# Patient Record
(demographics unavailable — no encounter records)

---

## 2025-06-25 NOTE — HISTORY OF PRESENT ILLNESS
[FreeTextEntry1] :  POSTPARTUM 6 WEEKS   27yo, , s/p NVD of baby boy Lexi, 7lbs 10oz, Apgar 8, 9. Fisrt degree perineal laceration repaired. EBL/   Breastfeeding well   Bleeding stopped at 5+wks   Laceration healed   Eating and drinking well   Sleeping when she can   Baby blues: denies   Support: good support from partner   Contraception: considering IUD, no contraindications   PE:  Breasts soft, NT, no masses, nipples intact, lactating  Abdomen soft, NT. Incision  Ext neg  Pelvic:  BUS neg  Vulva no lesions  Vagina well-healed  Cx LCP, NT  Uterus firm, NT  Adnexa NT, no masses  Tone: mildly lax  A: Normal 6 week PP exam, doing well, lactating   P: Self-care and baby care discussed Breastfeeding reviewed Diet and vitamins reviewed Cleared for exercise and sex Contraception options discussed RTO for annual/prn

## 2025-07-22 NOTE — PROCEDURE
[IUD Placement] : intrauterine device (IUD) placement [Time out performed] : Pre-procedure time out performed.  Patient's name, date of birth and procedure confirmed. [Consent Obtained] : Consent obtained [Prevention of Pregnancy] : prevention of pregnancy [Risks] : risks [Benefits] : benefits [Alternatives] : alternatives [Patient] : patient [Infection] : infection [Bleeding] : bleeding [Pain] : pain [Expulsion] : expulsion [Failure] : failure [Uterine Perforation] : uterine perforation [Neg Pregnancy Test] : negative pregnancy test [Neg GC/Chlamydia] : negative GC/Chlamydia [History of Unprotected Culp] : no history of unprotected intercourse [No Premedication] : No premedication [Betadine] : Betadine [Tenaculum] : Tenaculum [Easy Passage] : Easy passage [Sounded to ___ cm] : sounded to [unfilled] ~Ucm [Mirena IUD] : Mirena IUD [Tolerated Well] : Patient tolerated the procedure well [No Complications] : No complications [None] : None [LMPDate] : postpartum/lactating